# Patient Record
Sex: MALE | Race: WHITE
[De-identification: names, ages, dates, MRNs, and addresses within clinical notes are randomized per-mention and may not be internally consistent; named-entity substitution may affect disease eponyms.]

---

## 2018-03-09 ENCOUNTER — HOSPITAL ENCOUNTER (OUTPATIENT)
Dept: HOSPITAL 45 - C.LAB1850 | Age: 63
Discharge: HOME | End: 2018-03-09
Attending: INTERNAL MEDICINE
Payer: COMMERCIAL

## 2018-03-09 DIAGNOSIS — Z13.220: ICD-10-CM

## 2018-03-09 DIAGNOSIS — N40.0: ICD-10-CM

## 2018-03-09 DIAGNOSIS — D64.9: ICD-10-CM

## 2018-03-09 DIAGNOSIS — Z13.1: Primary | ICD-10-CM

## 2018-03-09 LAB
BASOPHILS # BLD: 0.02 K/UL (ref 0–0.2)
BASOPHILS NFR BLD: 0.3 %
BUN SERPL-MCNC: 14 MG/DL (ref 7–18)
CALCIUM SERPL-MCNC: 8.9 MG/DL (ref 8.5–10.1)
CO2 SERPL-SCNC: 30 MMOL/L (ref 21–32)
CREAT SERPL-MCNC: 0.95 MG/DL (ref 0.6–1.4)
EOS ABS #: 0.1 K/UL (ref 0–0.5)
EOSINOPHIL NFR BLD AUTO: 228 K/UL (ref 130–400)
GLUCOSE SERPL-MCNC: 86 MG/DL (ref 70–99)
HCT VFR BLD CALC: 42.7 % (ref 42–52)
HGB BLD-MCNC: 14.1 G/DL (ref 14–18)
IG#: 0.02 K/UL (ref 0–0.02)
IMM GRANULOCYTES NFR BLD AUTO: 30 %
KETONES UR QL STRIP: 84 MG/DL
LYMPHOCYTES # BLD: 1.84 K/UL (ref 1.2–3.4)
MCH RBC QN AUTO: 31.1 PG (ref 25–34)
MCHC RBC AUTO-ENTMCNC: 33 G/DL (ref 32–36)
MCV RBC AUTO: 94.1 FL (ref 80–100)
MONO ABS #: 0.76 K/UL (ref 0.11–0.59)
MONOCYTES NFR BLD: 12.4 %
NEUT ABS #: 3.39 K/UL (ref 1.4–6.5)
NEUTROPHILS # BLD AUTO: 1.6 %
NEUTROPHILS NFR BLD AUTO: 55.4 %
PH UR: 154 MG/DL (ref 0–200)
PMV BLD AUTO: 12.1 FL (ref 7.4–10.4)
POTASSIUM SERPL-SCNC: 4.1 MMOL/L (ref 3.5–5.1)
RED CELL DISTRIBUTION WIDTH CV: 12.7 % (ref 11.5–14.5)
RED CELL DISTRIBUTION WIDTH SD: 43.3 FL (ref 36.4–46.3)
SODIUM SERPL-SCNC: 139 MMOL/L (ref 136–145)
TRANSFERRIN SERPL-MCNC: 281 MG/DL (ref 200–360)
WBC # BLD AUTO: 6.13 K/UL (ref 4.8–10.8)

## 2018-04-20 ENCOUNTER — HOSPITAL ENCOUNTER (OUTPATIENT)
Dept: HOSPITAL 45 - C.GI | Age: 63
Discharge: HOME | End: 2018-04-20
Attending: INTERNAL MEDICINE
Payer: COMMERCIAL

## 2018-04-20 VITALS
WEIGHT: 195.42 LBS | HEIGHT: 72.99 IN | WEIGHT: 195.42 LBS | HEIGHT: 72.99 IN | BODY MASS INDEX: 25.9 KG/M2 | BODY MASS INDEX: 25.9 KG/M2

## 2018-04-20 VITALS — DIASTOLIC BLOOD PRESSURE: 77 MMHG | HEART RATE: 49 BPM | SYSTOLIC BLOOD PRESSURE: 126 MMHG | OXYGEN SATURATION: 100 %

## 2018-04-20 DIAGNOSIS — Z79.899: ICD-10-CM

## 2018-04-20 DIAGNOSIS — Z80.0: ICD-10-CM

## 2018-04-20 DIAGNOSIS — Z91.048: ICD-10-CM

## 2018-04-20 DIAGNOSIS — F41.9: ICD-10-CM

## 2018-04-20 DIAGNOSIS — Z83.71: ICD-10-CM

## 2018-04-20 DIAGNOSIS — Z86.010: ICD-10-CM

## 2018-04-20 DIAGNOSIS — Z12.11: Primary | ICD-10-CM

## 2018-04-20 DIAGNOSIS — Z88.2: ICD-10-CM

## 2018-04-20 DIAGNOSIS — F32.9: ICD-10-CM

## 2018-04-20 NOTE — ANESTHESIOLOGY PROGRESS NOTE
Anesthesia Post Op Note


Date & Time


Apr 20, 2018 at 09:36





Vital Signs


Pain Intensity:  0





Vital Signs Past 12 Hours








  Date Time  Temp Pulse Resp B/P (MAP) Pulse Ox O2 Delivery O2 Flow Rate FiO2


 


4/20/18 09:24  52 16 116/68 (84) 98 Room Air  


 


4/20/18 08:06 36.6 60 18 116/61 (79) 98 Room Air  











Notes


Mental Status:  alert / awake / arousable, participated in evaluation


Pt Amnestic to Procedure:  Yes


Nausea / Vomiting:  adequately controlled


Pain:  adequately controlled


Airway Patency, RR, SpO2:  stable & adequate


BP & HR:  stable & adequate


Hydration State:  stable & adequate


Anesthetic Complications:  no major complications apparent

## 2018-04-20 NOTE — ENDO HISTORY AND PHYSICAL
History & Physical


Date of Service:


Apr 20, 2018.


Chief Complaint:


history of polyps


Referring Physician:


Dr. Roldan Byrnes


History of Present Illness


h/o polyps





Past Surgical History


Hx Cardiac Surgery:  No


Hx Internal Defibrillator:  No


Hx Pacemaker:  No


Hx Abdominal Surgery:  No


Hx of Implantable Prosthesis:  No


Hx Post-Op Nausea and Vomiting:  No


Hx Cancer Surgery:  No


Hx Thoracic Surgery:  No


Hx Orthopedic:  Yes (RT KNEE MENISCUS REPAIR)


Hx Urinary Tract Surgery:  No





Family History


Colon CA, Polyp, IBD





Social History


Smoking Status:  Never Smoker


Hx Substance Use:  No


Hx Alcohol Use:  Yes (3-5 DRINKS WEEKLY)





Allergies


Coded Allergies:  


     Sulfa Drugs (Verified  Allergy, Mild, HIVES/CHILDHOOD, 4/17/18)


     Gluten (Verified  Adverse Reaction, Unknown, INTOLERANCE>BLOATING, 4/17/18)





Current Medications





Reported Home Medications








 Medications  Dose


 Route/Sig


 Max Daily Dose Days Date Category Dose


Instructions


 


 Ambien (Zolpidem


 Tartrate) 5 Mg Tab  5 Mg


 PO HS PRN


    4/17/18 Reported 


 


 Viagra


  (Sildenafil


 Citrate) 50 Mg Tab  25 Mg


 PO PRN


    4/17/18 Reported 


 


 Cymbalta


  (Duloxetine HCl)


 20 Mg Cap  1 Cap


 PO HS


    4/17/18 Reported 


 


 Ativan


  (Lorazepam) 0.5


 Mg Tab  1 Mg


 PO TID PRN


    4/17/18 Reported  TAKES WHEN FLYING











Vital Signs


Weight (Kilograms):  88.64


Height (Feet):  6


Height (Inches):  1











  Date Time  Temp Pulse Resp B/P (MAP) Pulse Ox O2 Delivery O2 Flow Rate FiO2


 


4/20/18 08:06 36.6 60 18 116/61 (79) 98 Room Air  











Physical Exam


General Appearance:  no apparent distress


Respiratory/Chest:  


   Auscultation:  breath sounds normal


Abdomen:  


   Inspection & Palpation:  soft





Assessment and Plan


h/o polyps - cscopy

## 2018-04-20 NOTE — GI REPORT
Procedure Date: 4/20/2018 8:20 AM

Procedure:            Colonoscopy

Indications:          Screening for colorectal malignant neoplasm

Medicines:            See the Anesthesia note for documentation of the 

                      administered medications

Complications:        No immediate complications.

Estimated Blood Loss: Estimated blood loss: none.

Procedure:            Pre-Anesthesia Assessment:

                      - ASA Grade Assessment: III - A patient with severe 

                      systemic disease.

                      After I obtained informed consent, the scope was passed 

                      under direct vision. Throughout the procedure, the 

                      patient's blood pressure, pulse, and oxygen saturations 

                      were monitored continuously. The Scope was introduced 

                      through the anus and advanced to the terminal ileum. 

                      The colonoscopy was performed without difficulty. The 

                      patient tolerated the procedure well. The quality of 

                      the bowel preparation was poor.

Findings:

     The perianal and digital rectal examinations were normal.

     The exam was grossly normal, but was limited by prep quality.

Impression:           - Preparation of the colon was poor.

                      - Normal exam, but poor prep.

Recommendation:       - Discharge patient to home. Repeat exam in after 2 day 

                      prep.

JAGDEEP Yusuf MD

4/20/2018 9:23:41 AM

This report has been signed electronically.

Note Initiated On: 4/20/2018 8:20 AM

     I attest to the content of the Intraoperative Record and orders 

     documented therein, exceptions below

## 2018-04-20 NOTE — DISCHARGE INSTRUCTIONS
Endoscopy Patient Instructions


Date / Procedure(s) Performed


Apr 20, 2018.


Colonoscopy





Allergy Information


Coded Allergies:  


     Sulfa Drugs (Verified  Allergy, Mild, HIVES/CHILDHOOD, 4/17/18)


     Gluten (Verified  Adverse Reaction, Unknown, INTOLERANCE>BLOATING, 4/17/18)





Discharge Date / Findings


Apr 20, 2018.


Normal exam but poor prep





Provider Instructions





Activity Restrictions





-  No exercising or heavy lifting for 24 hours. 


-  Do not drink alcohol the day of the procedure.


-  Do not drive a car or operate machinery until the day after the procedure.


-  Do not make any important decisions or sign important papers in 24 hours 

after the procedure.





Following Day:





-  Return to full activity which may include returning to work/school.





Diet





Start your diet with liquids and light foods (jello, soup, juice, toast).  Then 

eat your usual diet if not nauseated.





Treatment For Common After Affects





For mild abdominal pain, bloating, or excessive gas:





-  Rest


-  Eat lightly


-  Lie on right side





Follow-Up Information


Follow-up with Dr. Roldan Byrnes as scheduled





Anesthesia Information





What You Should Know





You have had a procedure that required some medicine to reduce anxiety and 

discomfort. This treatment is called moderate sedation.  


After receiving the treatment, you may be sleepy, but you will be able to 

breathe on your own.  The effects of the treatment may last for several hours.








Follow these instructions along with Activity/Diet recommendations noted above:





*  Do NOT do anything where dizziness or clumsiness would be dangerous.





*  Rest quietly at home today, then you can be up and about tomorrow.





*  Have a responsible person stay with you the rest of today.





*  You may have had an I.V. today.  If so, you may take the dressing off later 

today.





Recommendations


 


Call your doctor if:





*  Trouble breathing 





*  Continuous vomiting for more than 24 hours








*  Temperature above 101 degrees





*  Severe abdominal pain or bloating





*  Pain not relieved by pain medicine ordered





*  There is increased drainage or redness from any incision





*  A large amount of rectal bleeding greater than 2-3 tablespoons. 


   (If you had a polyp/s removed or have hemorrhoids, a small amount of blood -


    from the rectum is to be expected.)





*  You have any unanswered questions or concerns.








IN THE EVENT OF A SERIOUS EMERGENCY, GO TO THE NEAREST EMERGENCY ROOM








       Your discharge instructions were prepared by provider Cora Dubon.





 Patient Instructions Signature Page














Christopher Rondon 











Patient (or Guardian) Signature/Date:____________________________________ I 

have read and understand the instructions given to me by my caregivers.








Caregiver/RN/Doctor Signature/Date:____________________________________











The above-named patient and/or guardian has received patient instructions on 

this date.





























+  Original Patient Signature Page (only) stays with chart.  Please make copy 

for patient.